# Patient Record
Sex: MALE | URBAN - METROPOLITAN AREA
[De-identification: names, ages, dates, MRNs, and addresses within clinical notes are randomized per-mention and may not be internally consistent; named-entity substitution may affect disease eponyms.]

---

## 2018-11-29 ENCOUNTER — OFFICE VISIT (OUTPATIENT)
Dept: URBAN - METROPOLITAN AREA CLINIC 88 | Facility: CLINIC | Age: 78
End: 2018-11-29
Payer: MEDICAID

## 2018-11-29 PROCEDURE — 99203 OFFICE O/P NEW LOW 30 MIN: CPT | Performed by: OPHTHALMOLOGY

## 2018-11-29 RX ORDER — OFLOXACIN 3 MG/ML
0.3 % SOLUTION/ DROPS OPHTHALMIC
Qty: 1 | Refills: 0 | Status: ACTIVE
Start: 2018-11-29

## 2018-11-29 RX ORDER — PREDNISOLONE ACETATE 10 MG/ML
1 % SUSPENSION/ DROPS OPHTHALMIC
Qty: 1 | Refills: 1 | Status: ACTIVE
Start: 2018-11-29

## 2018-11-29 RX ORDER — KETOROLAC TROMETHAMINE 5 MG/ML
0.5 % SOLUTION/ DROPS OPHTHALMIC
Qty: 1 | Refills: 1 | Status: ACTIVE
Start: 2018-11-29

## 2018-11-29 ASSESSMENT — KERATOMETRY
OS: 42.50
OD: 42.13

## 2018-11-29 ASSESSMENT — INTRAOCULAR PRESSURE
OD: 17
OS: 16

## 2018-11-29 ASSESSMENT — VISUAL ACUITY
OS: 20/50
OD: 20/40

## 2018-11-29 NOTE — IMPRESSION/PLAN
Impression: Combined forms of age-related cataract, bilateral: H25.813. Plan: Cataracts account for the patient's complaints. Discussed all risks, benefits, procedures and recovery. Patient understands changing glasses will not improve vision. Patient desires to have surgery, recommend phacoemulsification with intraocular lens. Surgical risks and benefits were discussed, explained and to patient. Schedule Cataract sx OD. RL2.  Final post operative refractive decision will be made by Operative Surgeon

## 2018-12-11 ENCOUNTER — TESTING ONLY (OUTPATIENT)
Dept: URBAN - METROPOLITAN AREA CLINIC 88 | Facility: CLINIC | Age: 78
End: 2018-12-11
Payer: MEDICAID

## 2018-12-11 DIAGNOSIS — H25.813 COMBINED FORMS OF AGE-RELATED CATARACT, BILATERAL: Primary | ICD-10-CM

## 2018-12-11 PROCEDURE — 92136 OPHTHALMIC BIOMETRY: CPT | Performed by: OPHTHALMOLOGY
